# Patient Record
Sex: FEMALE | Race: WHITE | Employment: UNEMPLOYED | URBAN - METROPOLITAN AREA
[De-identification: names, ages, dates, MRNs, and addresses within clinical notes are randomized per-mention and may not be internally consistent; named-entity substitution may affect disease eponyms.]

---

## 2022-06-13 ENCOUNTER — TELEPHONE (OUTPATIENT)
Dept: FAMILY MEDICINE CLINIC | Age: 37
End: 2022-06-13

## 2022-12-27 ENCOUNTER — HOSPITAL ENCOUNTER (OUTPATIENT)
Age: 37
Setting detail: SPECIMEN
Discharge: HOME | End: 2022-12-27

## 2022-12-27 ENCOUNTER — OFFICE VISIT (OUTPATIENT)
Dept: FAMILY MEDICINE CLINIC | Age: 37
End: 2022-12-27
Payer: COMMERCIAL

## 2022-12-27 VITALS
SYSTOLIC BLOOD PRESSURE: 111 MMHG | BODY MASS INDEX: 22.54 KG/M2 | TEMPERATURE: 97.8 F | HEART RATE: 77 BPM | HEIGHT: 67 IN | DIASTOLIC BLOOD PRESSURE: 68 MMHG | WEIGHT: 143.6 LBS

## 2022-12-27 DIAGNOSIS — M54.9 ACUTE UPPER BACK PAIN: Primary | ICD-10-CM

## 2022-12-27 DIAGNOSIS — Z23 NEED FOR VIRAL IMMUNIZATION: ICD-10-CM

## 2022-12-27 LAB
HBV SURFACE AB TITR SER: <3.5 MIU/ML
RUBV IGG SER QL: 140.9 IU/ML

## 2022-12-27 PROCEDURE — 99213 OFFICE O/P EST LOW 20 MIN: CPT

## 2022-12-27 PROCEDURE — 99211 OFF/OP EST MAY X REQ PHY/QHP: CPT | Performed by: FAMILY MEDICINE

## 2022-12-27 PROCEDURE — G0008 ADMIN INFLUENZA VIRUS VAC: HCPCS | Performed by: FAMILY MEDICINE

## 2022-12-27 SDOH — ECONOMIC STABILITY: FOOD INSECURITY: WITHIN THE PAST 12 MONTHS, THE FOOD YOU BOUGHT JUST DIDN'T LAST AND YOU DIDN'T HAVE MONEY TO GET MORE.: NEVER TRUE

## 2022-12-27 SDOH — ECONOMIC STABILITY: FOOD INSECURITY: WITHIN THE PAST 12 MONTHS, YOU WORRIED THAT YOUR FOOD WOULD RUN OUT BEFORE YOU GOT MONEY TO BUY MORE.: NEVER TRUE

## 2022-12-27 ASSESSMENT — PATIENT HEALTH QUESTIONNAIRE - PHQ9
SUM OF ALL RESPONSES TO PHQ9 QUESTIONS 1 & 2: 2
SUM OF ALL RESPONSES TO PHQ QUESTIONS 1-9: 2
SUM OF ALL RESPONSES TO PHQ QUESTIONS 1-9: 2
2. FEELING DOWN, DEPRESSED OR HOPELESS: 2
SUM OF ALL RESPONSES TO PHQ QUESTIONS 1-9: 2
1. LITTLE INTEREST OR PLEASURE IN DOING THINGS: 0
SUM OF ALL RESPONSES TO PHQ QUESTIONS 1-9: 2

## 2022-12-27 ASSESSMENT — ENCOUNTER SYMPTOMS
SHORTNESS OF BREATH: 0
COUGH: 0
BACK PAIN: 1
WHEEZING: 0
CHEST TIGHTNESS: 0
GASTROINTESTINAL NEGATIVE: 1

## 2022-12-27 ASSESSMENT — SOCIAL DETERMINANTS OF HEALTH (SDOH): HOW HARD IS IT FOR YOU TO PAY FOR THE VERY BASICS LIKE FOOD, HOUSING, MEDICAL CARE, AND HEATING?: NOT HARD AT ALL

## 2022-12-27 NOTE — PROGRESS NOTES
Attending Physician Statement  I have discussed the care of Rodríguez Cook, 30 y.o. female,including pertinent history and exam findings,  with the resident Dr. Loly Palacio MD.  History:  Chief Complaint   Patient presents with    Immunizations     New patient, proof of vaccine status for visa     Health Maintenance     Pt due for pap      Patient is here on visa from [place] presenting today for lower back pain and further examination of her vaccination status. Patient has no red flags on her exam and no signs or symptoms of neurological injury per resident report. .  Patient would also like to be checked for TB as well. I have reviewed the key elements of the encounter with the resident. Examination was done by resident as documented in residents note. BP Readings from Last 3 Encounters:   12/27/22 111/68     /68 (Site: Right Upper Arm, Position: Sitting, Cuff Size: Medium Adult)   Pulse 77   Temp 97.8 °F (36.6 °C) (Oral)   Ht 5' 7\" (1.702 m)   Wt 143 lb 9.6 oz (65.1 kg)   LMP 12/12/2022 Comment: IUD  BMI 22.49 kg/m²   No results found for: WBC, HGB, HCT, PLT, CHOL, TRIG, HDL, LDLDIRECT, ALT, AST, NA, K, CL, CREATININE, BUN, CO2, TSH, PSA, INR, GLUF, LABA1C, LABMICR  No results found for: CALCIUM, PHOS  No results found for: LDLCALC, LDLCHOLESTEROL, LDLDIRECT  I agree with the assessment, plan and diagnosis of    Diagnosis Orders   1. Acute upper back pain        2. Need for viral immunization  Hepatitis B Surface Antibody    Mumps Antibody, IgG    Rubella Antibody, IgG    Rubeola Antibody IgG    Varicella Zoster Antibody, IgG    Diphtheria / Tetanus Antibody Panel    Bordetella Pertussis Antibodies  IgG & IgM    Poliovirus Antibodies    Quantiferon (R) TB Gold, (Incubated)        I agree with  orders as documented by the resident. Recommendations: Patient likely to benefit from smoking cessation and home physical therapy for her back pain.   We will obtain titer studies and as patient is from a low risk or low endemic area for TB patient could qualify for a intradermal TB check or serum study. Patient also likely benefit from HIV screening and hepatitis C screening. Return in about 6 months (around 6/27/2023).    (Jacky Jay ) Dr. Sanam Julian MD

## 2022-12-27 NOTE — PROGRESS NOTES
Visit Information    Have you changed or started any medications since your last visit including any over-the-counter medicines, vitamins, or herbal medicines? no   Have you stopped taking any of your medications? Is so, why? -  no  Are you having any side effects from any of your medications? - no    Have you seen any other physician or provider since your last visit?  no   Have you had any other diagnostic tests since your last visit?  no   Have you been seen in the emergency room and/or had an admission in a hospital since we last saw you?  no   Have you had your routine dental cleaning in the past 6 months?  no     Do you have an active MyChart account? If no, what is the barrier?   No: inactive    Patient Care Team:  Sandi Barron MD as PCP - General    Medical History Review  Past Medical, Family, and Social History reviewed and does not contribute to the patient presenting condition    Health Maintenance   Topic Date Due    COVID-19 Vaccine (1) Never done    Varicella vaccine (1 of 2 - 2-dose childhood series) Never done    Depression Screen  Never done    HIV screen  Never done    Hepatitis C screen  Never done    DTaP/Tdap/Td vaccine (1 - Tdap) Never done    Cervical cancer screen  Never done    Flu vaccine (1) Never done    Hepatitis A vaccine  Aged Out    Hib vaccine  Aged Out    Meningococcal (ACWY) vaccine  Aged Out    Pneumococcal 0-64 years Vaccine  Aged Out

## 2022-12-27 NOTE — PATIENT INSTRUCTIONS
Thank you for letting us take care of you today. We hope all your questions were addressed. If a question was overlooked or something else comes to mind after you return home, please contact a member of your Care Team listed below. Your Care Team at [place] [address] is Team #4  Fan Riley MD (Faculty)  Valeriy Morris MD (Resident)  Edy Carpio MD (Resident)  Adria Harp MD (Resident)  Livia Ramos MD (Resident)  JUSTINO Sparks., SANDRA Martel., Mona Huerta., [facility] office)  John Alvarez, [address] (Clinical Practice Manager)  Nuris Junior [facility] (Clinical Pharmacist)       Office phone number: [phone]    If you need to get in right away due to illness, please be advised we have \"Same Day\" appointments available Monday-Friday. Please call us at [phone] option #3 to schedule your \"Same Day\" appointment.

## 2022-12-28 LAB
MEASLES ANTIBODY IGG: 4.51
MUV IGG SER QL: 2.81
VZV IGG SER QL IA: 2.56

## 2022-12-29 LAB
DIPHTHERIA IGG AB: 0.8 IU/ML
QUANTI TB GOLD PLUS: NEGATIVE
QUANTI TB1 MINUS NIL: 0.02 IU/ML (ref 0–0.34)
QUANTI TB2 MINUS NIL: 0.03 IU/ML (ref 0–0.34)
QUANTIFERON MITOGEN: >10 IU/ML
QUANTIFERON NIL: 0.02 IU/ML
TETANUS IGG AB: 5.7 IU/ML

## 2022-12-30 LAB
BORDETELLA PERTUSSIS IGA: 0.5 IV
BORDETELLA PERTUSSIS IGG: 2.37 IV

## 2022-12-31 LAB
B PERTUS IGG,PT100: ABNORMAL
B PERTUSSIS IGG AB, QUANT: POSITIVE
B. PERTUSSIS, IGG, IMMUNOBLOT: ABNORMAL
BORDETELLA PERTUSSIS IGG: POSITIVE

## 2023-01-05 NOTE — PROGRESS NOTES
[facility]  [facility]  [facility] [address]     Date of Visit:  2023  Patient Name: Cee Daigle   Patient :  1985       Cee Daigle is a 30 y.o. female who presents today for an general visit to be evaluated for the following condition(s):  Chief Complaint   Patient presents with    Results     Following up to get lab results    Shoulder Pain     Her shoulder blade        Yovany is a 29 yo Czech bilingual female with high english proficiency and distant injury to her right shoulder p/today with R. Posterior shoulder pain. She states that the pain is every day with certain activities including sneezing. She injury her rotator cuff 10 years ago when lifting 40-50 kg dumbbells. She states old injury on the top her shoulder and points to her trapezius, etc. She states currently she has not been using her usual weights, 15 kg, for the last 1-2 weeks. She states no's change in range of motion or strength. States that she is smoking marijuana and not cigarettes. Patient states that she is dealing with a lot of stress since her moved to the [facility] more than 6 months ago. She also endorses once or twice a day diarrhea for the last 1 to 2 weeks which she is also experienced in the past but not this long. She attributes this to stress. She denies any blood or black stools. However, patient does endorse night sweats and general fatigue as well as irritability. Patient like to be tested for thyroid function. She also endorses that she would like to be followed with psychology for further support. Patient does endorse a suitable support system currently. She denies any SI/HI. Patient made aware of standard medication therapy options may be helpful and encouraged to stop using THC. Patient requesting vaccination for hepatitis B, tetanus, and HPV after lengthy discussion of vaccines needed especially with her visa.   Patient does not wish to get the pneumococcal vaccine at this time and states understanding of the risk of not getting this. Shoulder Pain   The pain is present in the right shoulder (Chronic R. Shoulder/neck pain). This is a new problem. The current episode started more than 1 month ago (3-5 months). There has been no history of extremity trauma. The problem occurs daily (with activity and sneezing, not with cough). The problem has been unchanged. Quality: sharp. The pain is at a severity of 7/10. Pain severity now: no pain to 7/10. Pertinent negatives include no fever, inability to bear weight, itching, joint locking, joint swelling, limited range of motion, numbness, stiffness or tingling. The symptoms are aggravated by activity. She has tried heat for the symptoms. The treatment provided no relief. Family history does not include gout or rheumatoid arthritis. There is no history of diabetes. Review of Systems:  Review of Systems   Constitutional:  Positive for diaphoresis and fatigue. Negative for activity change, appetite change, chills, fever and unexpected weight change. Respiratory: Negative. Cardiovascular: Negative. Gastrointestinal:  Positive for diarrhea. Negative for abdominal distention, abdominal pain, anal bleeding, blood in stool, nausea, rectal pain and vomiting. Endocrine: Negative for cold intolerance, heat intolerance, polydipsia, polyphagia and polyuria. Musculoskeletal:  Positive for myalgias and neck pain (Chronic, right-sided). Negative for arthralgias, back pain, gait problem, joint swelling, neck stiffness and stiffness. Skin: Negative. Negative for itching. Neurological:  Negative for tingling and numbness. Psychiatric/Behavioral:  Negative for self-injury. The patient is nervous/anxious. No Known Allergies    There is no problem list on file for this patient.       Past Medical History:   Diagnosis Date    Angioma, venous     Childhood Hx, LLE    [facility]     LLE, distant history 2/2 to Angioma per patient       No past surgical history on file. Social History     Socioeconomic History    Marital status:      Spouse name: None    Number of children: None    Years of education: None    Highest education level: None   Tobacco Use    Smoking status: Every Day     Packs/day: 0.50     Types: Cigarettes     Start date: 1/1/2001    Smokeless tobacco: Never   Vaping Use    Vaping Use: Never used   Substance and Sexual Activity    Alcohol use: Yes     Comment: socially    Drug use: Yes     Types: Marijuana Junaid Glenn)    Sexual activity: Yes     Partners: Male     Social Determinants of Health     Financial Resource Strain: Low Risk     Difficulty of Paying Living Expenses: Not hard at all   Food Insecurity: No Food Insecurity    Worried About [place] in the Last Year: Never true    Ran Out of Food in the Last Year: Never true        Physical Exam:    /68 (Site: Left Upper Arm, Position: Sitting, Cuff Size: Medium Adult)   Pulse 81   Temp 97.9 °F (36.6 °C) (Oral)   Ht 5' 7.01\" (1.702 m)   Wt 150 lb 12.8 oz (68.4 kg)   LMP 12/28/2022 (Exact Date) Comment: IUD  BMI 23.61 kg/m²    Physical Exam  Vitals and nursing note reviewed. Constitutional:       General: She is not in acute distress. Appearance: Normal appearance. She is normal weight. She is not ill-appearing, toxic-appearing or diaphoretic. HENT:      Head: Normocephalic and atraumatic. Eyes:      Extraocular Movements: Extraocular movements intact. Conjunctiva/sclera: Conjunctivae normal.   Cardiovascular:      Rate and Rhythm: Normal rate and regular rhythm. Heart sounds: Normal heart sounds. No murmur heard. No friction rub. No gallop. Pulmonary:      Effort: Pulmonary effort is normal. No respiratory distress. Breath sounds: Normal breath sounds. No stridor. No wheezing, rhonchi or rales. Chest:      Chest wall: No tenderness.    Musculoskeletal: General: Tenderness (Along right medial scapula) present. No swelling or deformity. Normal range of motion. Cervical back: Normal range of motion and neck supple. No rigidity. Comments: Pain at 170 to 180 degrees with shoulder abduction. Pain with external rotation. Winging of scapula noted on the right side. Skin:     Capillary Refill: Capillary refill takes less than 2 seconds. Neurological:      General: No focal deficit present. Mental Status: She is alert and oriented to person, place, and time. Motor: Motor function is intact. No weakness, atrophy or abnormal muscle tone. Psychiatric:         Mood and Affect: Mood normal.         Thought Content: Thought content normal.         Judgment: Judgment normal.       Assessment/Plan:  1. Injury of right shoulder, initial encounter  Comments:  Likely injury to serratus muscle on chronic Trapezious injury, Neuro exam WNL, ROM and str WNL in RUE; PT and c/w OTC pain meds   [facility]:  -     [facility]  2. Irritability  Comments:  likely 2/2 stress/adjustment component, how w/ night sweats, diarrhea will do labs pt request and revisit after patient sees psychology   Orders:  -     TSH; Future  -     Basic Metabolic Panel; Future  3. Other fatigue  -     TSH; Future  -     Basic Metabolic Panel; Future  4. Acute diarrhea  -     Basic Metabolic Panel; Future  5. Night sweats  -     Basic Metabolic Panel; Future  -     CBC with Auto Differential; Future  6. Need for hepatitis B booster vaccination  Comments:  Patient titers low for Hepatitis B vaccine, will readminister series at this time at patient request w/ VISA concerns   Orders:  -     Hep B, ENGERIX-B, (age 21 yrs+), IM, 1mL, 3-dose  7.  Need for HPV vaccination  Comments:  D/at length benefits such as cervical cancer risk reduction, though limited at pt age of series, and risks/SE profile, using shared decision making pt agrees  Orders:  -     HPV, GARDASIL 9, (age 10-36 yrs), IM  8. Need for Tdap vaccination  -     Tetanus-Diphth-Acell Pertussis ([address]) 5-2.5-18.5 LF-MCG/0.5 injection; Inject 0.5 mLs into the muscle once for 1 dose, Disp-0.5 mL, R-0Normal  -     Tdap, BOOSTRIX, (age 8 yrs+), IM     Per laboratory titers patient does show immunity to varicella, patient corroborates this stating that she had varicella as a child. Return in about 4 weeks (around 2/3/2023) for Follow up Diarrhea .     Electronically signed by Victor Manuel Baldwin MD on 1/6/2023 at 4:15 PM

## 2023-01-06 ENCOUNTER — OFFICE VISIT (OUTPATIENT)
Dept: FAMILY MEDICINE CLINIC | Age: 38
End: 2023-01-06
Payer: COMMERCIAL

## 2023-01-06 ENCOUNTER — HOSPITAL ENCOUNTER (OUTPATIENT)
Age: 38
Setting detail: SPECIMEN
Discharge: HOME | End: 2023-01-06

## 2023-01-06 VITALS
HEART RATE: 81 BPM | SYSTOLIC BLOOD PRESSURE: 115 MMHG | BODY MASS INDEX: 23.67 KG/M2 | HEIGHT: 67 IN | WEIGHT: 150.8 LBS | DIASTOLIC BLOOD PRESSURE: 68 MMHG | TEMPERATURE: 97.9 F

## 2023-01-06 DIAGNOSIS — R53.83 OTHER FATIGUE: ICD-10-CM

## 2023-01-06 DIAGNOSIS — Z23 NEED FOR TDAP VACCINATION: ICD-10-CM

## 2023-01-06 DIAGNOSIS — R19.7 ACUTE DIARRHEA: ICD-10-CM

## 2023-01-06 DIAGNOSIS — R45.4 IRRITABILITY: ICD-10-CM

## 2023-01-06 DIAGNOSIS — Z23 NEED FOR HPV VACCINATION: ICD-10-CM

## 2023-01-06 DIAGNOSIS — R61 NIGHT SWEATS: ICD-10-CM

## 2023-01-06 DIAGNOSIS — S49.91XA INJURY OF RIGHT SHOULDER, INITIAL ENCOUNTER: Primary | ICD-10-CM

## 2023-01-06 DIAGNOSIS — Z23 NEED FOR HEPATITIS B BOOSTER VACCINATION: ICD-10-CM

## 2023-01-06 LAB
ABSOLUTE EOS #: 0.16 K/UL (ref 0–0.44)
ABSOLUTE IMMATURE GRANULOCYTE: <0.03 K/UL (ref 0–0.3)
ABSOLUTE LYMPH #: 2.36 K/UL (ref 1.1–3.7)
ABSOLUTE MONO #: 0.42 K/UL (ref 0.1–1.2)
ANION GAP SERPL CALCULATED.3IONS-SCNC: 11 MMOL/L (ref 9–17)
BASOPHILS # BLD: 1 % (ref 0–2)
BASOPHILS ABSOLUTE: 0.08 K/UL (ref 0–0.2)
BUN BLDV-MCNC: 10 MG/DL (ref 6–20)
CALCIUM SERPL-MCNC: 9.4 MG/DL (ref 8.6–10.4)
CHLORIDE BLD-SCNC: 101 MMOL/L (ref 98–107)
CO2: 24 MMOL/L (ref 20–31)
CREAT SERPL-MCNC: 0.75 MG/DL (ref 0.5–0.9)
EOSINOPHILS RELATIVE PERCENT: 3 % (ref 1–4)
GFR SERPL CREATININE-BSD FRML MDRD: >60 ML/MIN/1.73M2
GLUCOSE BLD-MCNC: 93 MG/DL (ref 70–99)
HCT VFR BLD CALC: 40.1 % (ref 36.3–47.1)
HEMOGLOBIN: 13.2 G/DL (ref 11.9–15.1)
IMMATURE GRANULOCYTES: 0 %
LYMPHOCYTES # BLD: 39 % (ref 24–43)
MCH RBC QN AUTO: 30.8 PG (ref 25.2–33.5)
MCHC RBC AUTO-ENTMCNC: 32.9 G/DL (ref 28.4–34.8)
MCV RBC AUTO: 93.5 FL (ref 82.6–102.9)
MONOCYTES # BLD: 7 % (ref 3–12)
NRBC AUTOMATED: 0 PER 100 WBC
PDW BLD-RTO: 12.6 % (ref 11.8–14.4)
PLATELET # BLD: 288 K/UL (ref 138–453)
PMV BLD AUTO: 10.1 FL (ref 8.1–13.5)
POTASSIUM SERPL-SCNC: 4.1 MMOL/L (ref 3.7–5.3)
RBC # BLD: 4.29 M/UL (ref 3.95–5.11)
SEG NEUTROPHILS: 50 % (ref 36–65)
SEGMENTED NEUTROPHILS ABSOLUTE COUNT: 3.09 K/UL (ref 1.5–8.1)
SODIUM BLD-SCNC: 136 MMOL/L (ref 135–144)
TSH SERPL DL<=0.05 MIU/L-ACNC: 0.49 UIU/ML (ref 0.3–5)
WBC # BLD: 6.1 K/UL (ref 3.5–11.3)

## 2023-01-06 PROCEDURE — 90651 9VHPV VACCINE 2/3 DOSE IM: CPT | Performed by: FAMILY MEDICINE

## 2023-01-06 PROCEDURE — 90746 HEPB VACCINE 3 DOSE ADULT IM: CPT | Performed by: FAMILY MEDICINE

## 2023-01-06 PROCEDURE — 90472 IMMUNIZATION ADMIN EACH ADD: CPT | Performed by: FAMILY MEDICINE

## 2023-01-06 ASSESSMENT — ENCOUNTER SYMPTOMS
BACK PAIN: 0
RECTAL PAIN: 0
VOMITING: 0
ABDOMINAL DISTENTION: 0
ABDOMINAL PAIN: 0
BLOOD IN STOOL: 0
NAUSEA: 0
DIARRHEA: 1
RESPIRATORY NEGATIVE: 1
ANAL BLEEDING: 0

## 2023-01-06 ASSESSMENT — PATIENT HEALTH QUESTIONNAIRE - PHQ9
1. LITTLE INTEREST OR PLEASURE IN DOING THINGS: 1
SUM OF ALL RESPONSES TO PHQ QUESTIONS 1-9: 3
DEPRESSION UNABLE TO ASSESS: PT REFUSES
SUM OF ALL RESPONSES TO PHQ QUESTIONS 1-9: 3
SUM OF ALL RESPONSES TO PHQ QUESTIONS 1-9: 3
2. FEELING DOWN, DEPRESSED OR HOPELESS: 2
SUM OF ALL RESPONSES TO PHQ QUESTIONS 1-9: 3
SUM OF ALL RESPONSES TO PHQ9 QUESTIONS 1 & 2: 3

## 2023-01-06 NOTE — PATIENT INSTRUCTIONS
Thank you for letting us take care of you today. We hope all your questions were addressed. If a question was overlooked or something else comes to mind after you return home, please contact a member of your Care Team listed below. Your Care Team at [place] [address] is Team #5  Carmen Chen MD (Faculty)  Nova Tijerina MD (Resident)  Annel Cheng MD (Resident)  Rudy Mccormick MD (Resident)  Ashtyn Serrato MD, (Resident)  Jalyn Coffey., [facility]  Syd Guan., ROEL Blanca.,  N  Meryle Mcbride., Jyoti Tomas., [facility] office)  Ashtyn Albert, [address] (Clinical Practice Manager)  Riley Busby, [facility] (Clinical Pharmacist)       Office phone number: [phone]    If you need to get in right away due to illness, please be advised we have \"Same Day\" appointments available Monday-Friday. Please call us at [phone] option #3 to schedule your \"Same Day\" appointment.

## 2023-01-06 NOTE — PROGRESS NOTES
Visit Information    Have you changed or started any medications since your last visit including any over-the-counter medicines, vitamins, or herbal medicines? no   Have you stopped taking any of your medications? Is so, why? -  no  Are you having any side effects from any of your medications? - no    Have you seen any other physician or provider since your last visit?  no   Have you had any other diagnostic tests since your last visit? yes - Labs   Have you been seen in the emergency room and/or had an admission in a hospital since we last saw you?  no   Have you had your routine dental cleaning in the past 6 months?  no     Do you have an active MyChart account? If no, what is the barrier?   No: Pending    Patient Care Team:  Tita Sanchez MD as PCP - General    Medical History Review  Past Medical, Family, and Social History reviewed and does not contribute to the patient presenting condition    Health Maintenance   Topic Date Due    COVID-19 Vaccine (1) Never done    Varicella vaccine (1 of 2 - 2-dose childhood series) Never done    Pneumococcal 0-64 years Vaccine (1 - PCV) Never done    HIV screen  Never done    Hepatitis C screen  Never done    DTaP/Tdap/Td vaccine (1 - Tdap) Never done    Cervical cancer screen  Never done    Depression Screen  12/27/2023    Flu vaccine  Completed    Hepatitis A vaccine  Aged Out    Hib vaccine  Aged Out    Meningococcal (ACWY) vaccine  Aged Out

## 2023-01-10 ENCOUNTER — TELEPHONE (OUTPATIENT)
Dept: FAMILY MEDICINE CLINIC | Age: 38
End: 2023-01-10

## 2023-01-10 NOTE — TELEPHONE ENCOUNTER
----- Message from Nelly Angela sent at 1/10/2023  9:02 AM EST -----  Subject: Results Request    QUESTIONS  Results: 1/6/2023; Ordered by:   Date Performed: 2023-01-06  ---------------------------------------------------------------------------  --------------  Rufus Lan INFO    [phone]; OK to leave message on voicemail  ---------------------------------------------------------------------------  --------------

## 2023-01-16 ENCOUNTER — TELEPHONE (OUTPATIENT)
Dept: FAMILY MEDICINE CLINIC | Age: 38
End: 2023-01-16

## 2023-01-16 DIAGNOSIS — Z23 NEED FOR HEPATITIS B BOOSTER VACCINATION: Primary | ICD-10-CM

## 2023-01-16 DIAGNOSIS — Z23 NEED FOR HPV VACCINATION: ICD-10-CM

## 2023-01-16 NOTE — TELEPHONE ENCOUNTER
Britney Arreguin,     Could you please contact the patient and let her know that I am able to give her a letter stating all three vaccines given, or perhaps give a reprint of the AVS or a print out of the vaccines from the [place] vaccine registry? Also, can we set her up for a nursing visit to get her 2nd (last) Hepatitis B vaccine and there 2nd (2/3) HPV vaccine doses too? Let me know if she needs anything else.       Thank you,     Linda Mcdonough

## 2023-01-16 NOTE — TELEPHONE ENCOUNTER
----- Message from Qian Alston sent at 1/13/2023  4:55 PM EST -----  Regarding: Prove Ashanti Mojica   Dear Dr Rita Gaines,    First of all I hope you are doing well. Im sorry to bother you but the day i came i had 3 vaccins and only 2 are notified on my visit report. I just noticed it  and i really need this report for all the vaccins i received before Tuesday. Do i need to come back to the office ? Thank you very much for your time.      Sincerely,

## 2023-01-16 NOTE — TELEPHONE ENCOUNTER
Writer called patient to ask a few questions and schedule a follow up for the next set of [address]. Writer had to [facility] for her to call back.

## 2023-01-17 ENCOUNTER — OFFICE VISIT (OUTPATIENT)
Dept: FAMILY MEDICINE CLINIC | Age: 38
End: 2023-01-17
Payer: COMMERCIAL

## 2023-01-17 VITALS
WEIGHT: 147.8 LBS | HEART RATE: 70 BPM | HEIGHT: 67 IN | BODY MASS INDEX: 23.2 KG/M2 | DIASTOLIC BLOOD PRESSURE: 73 MMHG | TEMPERATURE: 97 F | SYSTOLIC BLOOD PRESSURE: 109 MMHG

## 2023-01-17 DIAGNOSIS — Z23 NEED FOR PNEUMOCOCCAL VACCINATION: ICD-10-CM

## 2023-01-17 DIAGNOSIS — L42 PITYRIASIS ROSEA: Primary | ICD-10-CM

## 2023-01-17 PROCEDURE — 90677 PCV20 VACCINE IM: CPT | Performed by: FAMILY MEDICINE

## 2023-01-17 PROCEDURE — 99214 OFFICE O/P EST MOD 30 MIN: CPT | Performed by: FAMILY MEDICINE

## 2023-01-17 ASSESSMENT — ENCOUNTER SYMPTOMS
RHINORRHEA: 0
SORE THROAT: 0
RESPIRATORY NEGATIVE: 1
SHORTNESS OF BREATH: 0
NAIL CHANGES: 0
EYE PAIN: 0
COUGH: 0
VOMITING: 0

## 2023-01-17 NOTE — PATIENT INSTRUCTIONS
Thank you for letting us take care of you today. We hope all your questions were addressed. If a question was overlooked or something else comes to mind after you return home, please contact a member of your Care Team listed below. Your Care Team at [place] [address] is Team #5  Delmer Chamberlain MD (Faculty)  Akhil Cortez MD (Resident)  Tabitha Cole MD (Resident)  Eyad Bear MD (Resident)  Melvi Arnett MD, (Resident)  Clemente Monzon., BETHANIE Warren., RMA  Ingrid Patel.,  LPN  Valeria Phoenix., Silvia Sam., [facility] office)  Jesse Lange, [address] (Clinical Practice Manager)  Apolinar Cochran [facility] (Clinical Pharmacist)       Office phone number: [phone]    If you need to get in right away due to illness, please be advised we have \"Same Day\" appointments available Monday-Friday. Please call us at [phone] option #3 to schedule your \"Same Day\" appointment.

## 2023-01-17 NOTE — PROGRESS NOTES
Visit Information    Have you changed or started any medications since your last visit including any over-the-counter medicines, vitamins, or herbal medicines? no   Have you stopped taking any of your medications? Is so, why? -  no  Are you having any side effects from any of your medications? - no    Have you seen any other physician or provider since your last visit?  no   Have you had any other diagnostic tests since your last visit? yes - Labs   Have you been seen in the emergency room and/or had an admission in a hospital since we last saw you?  no   Have you had your routine dental cleaning in the past 6 months?  no     Do you have an active MyChart account? If no, what is the barrier?   Yes    Patient Care Team:  Zna Crooks MD as PCP - General (Family Medicine)    Medical History Review  Past Medical, Family, and Social History reviewed and does contribute to the patient presenting condition    Health Maintenance   Topic Date Due    COVID-19 Vaccine (1) Never done    Varicella vaccine (1 of 2 - 2-dose childhood series) Never done    Pneumococcal 0-64 years Vaccine (1 - PCV) Never done    HIV screen  Never done    Hepatitis C screen  Never done    Cervical cancer screen  Never done    Depression Screen  01/06/2024    DTaP/Tdap/Td vaccine (2 - Td or Tdap) 01/06/2033    Flu vaccine  Completed    Hepatitis A vaccine  Aged Out    Hib vaccine  Aged Out    Meningococcal (ACWY) vaccine  Aged Out

## 2023-01-17 NOTE — PROGRESS NOTES
[facility]  [facility]  [facility] [id number]     Date of Visit:  2023  Patient Name: Cici Lanza   Patient :  1985       Cici Lanza is a 30 y.o. female who presents today for an general visit to be evaluated for the following condition(s):  Chief Complaint   Patient presents with    Immunizations     Updated immunizations    Rash       Argelia Melgar is a 29 yo Syrian bilingual female with high english proficiency p/today for follow up vaccinations and a rash since our last visit. She states that she needs to get the pneumonia vaccine and wanted to know when she needs to get her next vaccines. She is going tomorrow for her visa appointment. Patient aware that she is immune to several organisms listed for visa like varicella per recent titers. She states the rash started about a week ago on her back with one patch and is mildly itchy. She states then it has spread on her back to her torso and chest.  She states she is not taking anything for the itch. Patient states she is still getting non-bloody mild diarrhea semi-daily during the day, 1-2x at most per day. She still attributes this to stress and denies any red flag symptoms (orthostatic, dizziness, fevers, chills, etc). She is agreeable to tabling this until our follow up in February. Pt is agreable to doing her 2nd Hep B () and 2nd HPV vaccine on 23 (2nd of 3rd in series). We discussed at length signs and symptoms to return to clinic for sooner and risks/benefits and SE profile of PCV20 at length. Rash  This is a new problem. The current episode started in the past 7 days. The problem has been gradually worsening since onset. The affected locations include the abdomen, torso, chest and back. The rash is characterized by itchiness. It is unknown if there was an exposure to a precipitant.  Pertinent negatives include no anorexia, congestion, cough, eye pain, facial edema, fatigue, fever, joint pain, nail changes, rhinorrhea, shortness of breath, sore throat or vomiting. Treatments tried: oat bath. The treatment provided mild relief. Review of Systems:  Review of Systems   Constitutional: Negative. Negative for fatigue and fever. HENT: Negative. Negative for congestion, rhinorrhea and sore throat. Eyes:  Negative for pain. Respiratory: Negative. Negative for cough and shortness of breath. Cardiovascular: Negative. Gastrointestinal:  Negative for anorexia and vomiting. Musculoskeletal:  Negative for joint pain. Skin:  Positive for rash. Negative for nail changes. Prior to Admission medications    Not on File        No Known Allergies    There is no problem list on file for this patient. Past Medical History:   Diagnosis Date    Angioma, venous     Childhood Hx, LLE    DVT (deep venous thrombosis) ([facility])     LLE, distant history 2/2 to Angioma per patient       No past surgical history on file.      Social History     Socioeconomic History    Marital status:      Spouse name: None    Number of children: None    Years of education: None    Highest education level: None   Tobacco Use    Smoking status: Every Day     Packs/day: 0.50     Types: Cigarettes     Start date: 1/1/2001    Smokeless tobacco: Never   Vaping Use    Vaping Use: Never used   Substance and Sexual Activity    Alcohol use: Yes     Comment: socially    Drug use: Yes     Types: Marijuana Celso [place])    Sexual activity: Yes     Partners: Male     Social Determinants of Health     Financial Resource Strain: Low Risk     Difficulty of Paying Living Expenses: Not hard at all   Food Insecurity: No Food Insecurity    Worried About [address] [place] [address] [employer] in the Last Year: Never true    Ran Out of Food in the Last Year: Never true        Physical Exam:    /73 (Site: Right Upper Arm, Position: Sitting, Cuff Size: Medium Adult)   Pulse 70   Temp 97 °F (36.1 °C) (Oral)   Ht 5' 7.01\" (1.702 m)   Wt 147 lb 12.8 oz (67 kg)   LMP 12/28/2022 (Exact Date) Comment: IUD  BMI 23.14 kg/m²    Physical Exam  Vitals and nursing note reviewed. Constitutional:       General: She is not in acute distress. Appearance: Normal appearance. She is normal weight. She is not ill-appearing, toxic-appearing or diaphoretic. HENT:      Head: Normocephalic and atraumatic. Eyes:      Extraocular Movements: Extraocular movements intact. Conjunctiva/sclera: Conjunctivae normal.   Musculoskeletal:      Cervical back: Normal range of motion and neck supple. No rigidity. Skin:     Capillary Refill: Capillary refill takes less than 2 seconds. Findings: Rash (erythematous rasied rashes in various sizes with largest 4x5 cm on the R. Lower back with mild central scaling, no central clearing, smaller raised well demaracted lesions on abdomen/torso and chest present with mild occasional scaling and excoriation) present. Neurological:      Mental Status: She is alert and oriented to person, place, and time. Psychiatric:         Mood and Affect: Mood normal.         Behavior: Behavior normal.         Thought Content: Thought content normal.         Judgment: Judgment normal.       Assessment/Plan:  1. Pityriasis rosea  Comments:  Mild to moderate, pt declines tx at this time, no si/sx's of allergic rxn, if no improvement after 2 months or if rash changes will consider skin scraping/bx/tx  2. Need for pneumococcal vaccination  Comments:  D/at length risks/benefits/SE profile in setting of likely [place]/above and possible extension of rash, no signs of allergic reaction, pt states understanding  Orders:  -     Pneumococcal, PCV20, PREVNAR 20, (age 25 yrs+), IM, PF     Return if symptoms worsen or fail to improve.     Electronically signed by Joel Beal MD on 1/17/2023 at 2:08 PM

## 2024-09-12 ENCOUNTER — LAB (OUTPATIENT)
Dept: PRIMARY CARE CLINIC | Age: 39
End: 2024-09-12

## 2024-09-12 ENCOUNTER — OFFICE VISIT (OUTPATIENT)
Dept: FAMILY MEDICINE CLINIC | Age: 39
End: 2024-09-12

## 2024-09-12 VITALS
TEMPERATURE: 97.1 F | DIASTOLIC BLOOD PRESSURE: 60 MMHG | HEART RATE: 83 BPM | WEIGHT: 142.2 LBS | OXYGEN SATURATION: 98 % | HEIGHT: 67 IN | SYSTOLIC BLOOD PRESSURE: 106 MMHG | BODY MASS INDEX: 22.32 KG/M2

## 2024-09-12 DIAGNOSIS — Z13.31 DEPRESSION SCREENING: ICD-10-CM

## 2024-09-12 DIAGNOSIS — Z76.89 ENCOUNTER TO ESTABLISH CARE: Primary | ICD-10-CM

## 2024-09-12 DIAGNOSIS — R05.1 ACUTE COUGH: ICD-10-CM

## 2024-09-12 DIAGNOSIS — Z13.1 DIABETES MELLITUS SCREENING: ICD-10-CM

## 2024-09-12 DIAGNOSIS — R09.89 CHEST CONGESTION: ICD-10-CM

## 2024-09-12 DIAGNOSIS — E55.9 VITAMIN D INSUFFICIENCY: ICD-10-CM

## 2024-09-12 DIAGNOSIS — Z13.220 SCREENING FOR CHOLESTEROL LEVEL: ICD-10-CM

## 2024-09-12 DIAGNOSIS — Z11.4 SCREENING FOR HIV (HUMAN IMMUNODEFICIENCY VIRUS): ICD-10-CM

## 2024-09-12 DIAGNOSIS — J40 BRONCHITIS: ICD-10-CM

## 2024-09-12 DIAGNOSIS — Z00.00 ROUTINE HEALTH MAINTENANCE: ICD-10-CM

## 2024-09-12 DIAGNOSIS — F17.200 SMOKER: ICD-10-CM

## 2024-09-12 DIAGNOSIS — Z11.59 NEED FOR HEPATITIS C SCREENING TEST: ICD-10-CM

## 2024-09-12 RX ORDER — ALBUTEROL SULFATE 90 UG/1
2 AEROSOL, METERED RESPIRATORY (INHALATION) EVERY 6 HOURS PRN
Qty: 18 G | Refills: 0 | Status: SHIPPED | OUTPATIENT
Start: 2024-09-12

## 2024-09-12 RX ORDER — BENZONATATE 200 MG/1
200 CAPSULE ORAL 3 TIMES DAILY PRN
Qty: 30 CAPSULE | Refills: 0 | Status: SHIPPED | OUTPATIENT
Start: 2024-09-12 | End: 2024-09-22

## 2024-09-12 RX ORDER — METHYLPREDNISOLONE 4 MG
TABLET, DOSE PACK ORAL
Qty: 1 KIT | Refills: 0 | Status: SHIPPED | OUTPATIENT
Start: 2024-09-12 | End: 2024-09-18

## 2024-09-12 RX ORDER — DOXYCYCLINE HYCLATE 100 MG
100 TABLET ORAL 2 TIMES DAILY
Qty: 20 TABLET | Refills: 0 | Status: SHIPPED | OUTPATIENT
Start: 2024-09-12 | End: 2024-09-22

## 2024-09-12 SDOH — ECONOMIC STABILITY: INCOME INSECURITY: HOW HARD IS IT FOR YOU TO PAY FOR THE VERY BASICS LIKE FOOD, HOUSING, MEDICAL CARE, AND HEATING?: NOT HARD AT ALL

## 2024-09-12 SDOH — HEALTH STABILITY: PHYSICAL HEALTH: ON AVERAGE, HOW MANY MINUTES DO YOU ENGAGE IN EXERCISE AT THIS LEVEL?: 60 MIN

## 2024-09-12 SDOH — ECONOMIC STABILITY: FOOD INSECURITY: WITHIN THE PAST 12 MONTHS, THE FOOD YOU BOUGHT JUST DIDN'T LAST AND YOU DIDN'T HAVE MONEY TO GET MORE.: NEVER TRUE

## 2024-09-12 SDOH — ECONOMIC STABILITY: FOOD INSECURITY: WITHIN THE PAST 12 MONTHS, YOU WORRIED THAT YOUR FOOD WOULD RUN OUT BEFORE YOU GOT MONEY TO BUY MORE.: NEVER TRUE

## 2024-09-12 SDOH — HEALTH STABILITY: PHYSICAL HEALTH: ON AVERAGE, HOW MANY DAYS PER WEEK DO YOU ENGAGE IN MODERATE TO STRENUOUS EXERCISE (LIKE A BRISK WALK)?: 4 DAYS

## 2024-09-12 ASSESSMENT — ENCOUNTER SYMPTOMS
COUGH: 1
EYES NEGATIVE: 1
GASTROINTESTINAL NEGATIVE: 1
CHEST TIGHTNESS: 1
SHORTNESS OF BREATH: 0
WHEEZING: 0
ALLERGIC/IMMUNOLOGIC NEGATIVE: 1

## 2024-09-12 ASSESSMENT — PATIENT HEALTH QUESTIONNAIRE - PHQ9
SUM OF ALL RESPONSES TO PHQ QUESTIONS 1-9: 0
1. LITTLE INTEREST OR PLEASURE IN DOING THINGS: NOT AT ALL
2. FEELING DOWN, DEPRESSED OR HOPELESS: NOT AT ALL
SUM OF ALL RESPONSES TO PHQ9 QUESTIONS 1 & 2: 0
SUM OF ALL RESPONSES TO PHQ QUESTIONS 1-9: 0